# Patient Record
Sex: MALE | Race: WHITE | NOT HISPANIC OR LATINO | Employment: FULL TIME | URBAN - METROPOLITAN AREA
[De-identification: names, ages, dates, MRNs, and addresses within clinical notes are randomized per-mention and may not be internally consistent; named-entity substitution may affect disease eponyms.]

---

## 2020-01-09 ENCOUNTER — OFFICE VISIT (OUTPATIENT)
Dept: UROLOGY | Facility: CLINIC | Age: 60
End: 2020-01-09
Payer: COMMERCIAL

## 2020-01-09 ENCOUNTER — TELEPHONE (OUTPATIENT)
Dept: UROLOGY | Facility: CLINIC | Age: 60
End: 2020-01-09

## 2020-01-09 VITALS
BODY MASS INDEX: 25.2 KG/M2 | WEIGHT: 180 LBS | HEART RATE: 64 BPM | RESPIRATION RATE: 18 BRPM | DIASTOLIC BLOOD PRESSURE: 79 MMHG | SYSTOLIC BLOOD PRESSURE: 115 MMHG | HEIGHT: 71 IN

## 2020-01-09 DIAGNOSIS — R10.2 MALE PERINEAL PAIN: ICD-10-CM

## 2020-01-09 DIAGNOSIS — N41.1 CHRONIC BACTERIAL PROSTATITIS: Primary | ICD-10-CM

## 2020-01-09 DIAGNOSIS — N43.40 SPERMATOCELE: ICD-10-CM

## 2020-01-09 PROBLEM — I10 HYPERTENSIVE DISORDER: Status: ACTIVE | Noted: 2019-05-14

## 2020-01-09 PROBLEM — F41.9 ANXIETY: Status: ACTIVE | Noted: 2019-05-14

## 2020-01-09 PROBLEM — E05.90 HYPERTHYROIDISM: Status: ACTIVE | Noted: 2019-05-14

## 2020-01-09 LAB
BILIRUB UR QL STRIP: NEGATIVE
BILIRUB UR QL STRIP: NEGATIVE
GLUCOSE UR QL STRIP: NEGATIVE
GLUCOSE UR QL STRIP: NEGATIVE
KETONES UR QL STRIP: NEGATIVE
KETONES UR QL STRIP: NEGATIVE
LEUKOCYTE ESTERASE UR QL STRIP: NEGATIVE
LEUKOCYTE ESTERASE UR QL STRIP: NEGATIVE
PH, POC UA: 5.5
PH, POC UA: 6
POC AMORP, URINE: 0
POC AMORP, URINE: 0
POC BACTI, URINE: 0
POC BACTI, URINE: ABNORMAL
POC BLOOD, URINE: NEGATIVE
POC BLOOD, URINE: POSITIVE
POC CASTS, URINE: 0
POC CASTS, URINE: 0
POC CRYST, URINE: 0
POC CRYST, URINE: 0
POC EPITH, URINE: 0
POC EPITH, URINE: ABNORMAL
POC HCG, URINE: ABNORMAL
POC HCG, URINE: ABNORMAL
POC HYALIN, URINE: 0 LPF
POC HYALIN, URINE: 0 LPF
POC MUCUS, URINE: 0
POC MUCUS, URINE: ABNORMAL
POC NITRATES, URINE: NEGATIVE
POC NITRATES, URINE: NEGATIVE
POC OTHER, URINE: 0
POC OTHER, URINE: 0
POC RBC, URINE: 0 HPF
POC RBC, URINE: ABNORMAL HPF
POC WBC, URINE: 0 HPF
POC WBC, URINE: ABNORMAL HPF
PROT UR QL STRIP: NEGATIVE
PROT UR QL STRIP: NEGATIVE
PSA, DIAGNOSTIC: 1.6 NG/ML (ref 0.1–4)
SP GR UR STRIP: 1.02 (ref 1–1.03)
SP GR UR STRIP: 1.02 (ref 1–1.03)
UROBILINOGEN UR STRIP-ACNC: 0.2 (ref 0.3–2.2)
UROBILINOGEN UR STRIP-ACNC: 0.2 (ref 0.3–2.2)

## 2020-01-09 PROCEDURE — 81001 PR  URINALYSIS, AUTO, W/SCOPE: ICD-10-PCS | Mod: S$GLB,,, | Performed by: NURSE PRACTITIONER

## 2020-01-09 PROCEDURE — 99204 PR OFFICE/OUTPT VISIT, NEW, LEVL IV, 45-59 MIN: ICD-10-PCS | Mod: 25,S$GLB,, | Performed by: NURSE PRACTITIONER

## 2020-01-09 PROCEDURE — 81001 URINALYSIS AUTO W/SCOPE: CPT | Mod: S$GLB,,, | Performed by: NURSE PRACTITIONER

## 2020-01-09 PROCEDURE — 36415 COLL VENOUS BLD VENIPUNCTURE: CPT | Mod: S$GLB,,, | Performed by: NURSE PRACTITIONER

## 2020-01-09 PROCEDURE — 36415 PR COLLECTION VENOUS BLOOD,VENIPUNCTURE: ICD-10-PCS | Mod: S$GLB,,, | Performed by: NURSE PRACTITIONER

## 2020-01-09 PROCEDURE — 3008F PR BODY MASS INDEX (BMI) DOCUMENTED: ICD-10-PCS | Mod: CPTII,S$GLB,, | Performed by: NURSE PRACTITIONER

## 2020-01-09 PROCEDURE — 99204 OFFICE O/P NEW MOD 45 MIN: CPT | Mod: 25,S$GLB,, | Performed by: NURSE PRACTITIONER

## 2020-01-09 PROCEDURE — 3008F BODY MASS INDEX DOCD: CPT | Mod: CPTII,S$GLB,, | Performed by: NURSE PRACTITIONER

## 2020-01-09 RX ORDER — METHYLPHENIDATE HYDROCHLORIDE 10 MG/1
TABLET ORAL
COMMUNITY
Start: 2019-12-14

## 2020-01-09 RX ORDER — CIPROFLOXACIN 250 MG/1
TABLET, FILM COATED ORAL
COMMUNITY
Start: 2019-11-25

## 2020-01-09 RX ORDER — METOPROLOL TARTRATE 50 MG/1
TABLET ORAL
COMMUNITY
Start: 2020-01-03

## 2020-01-09 RX ORDER — LEVOTHYROXINE SODIUM 137 UG/1
TABLET ORAL
COMMUNITY
Start: 2020-01-03

## 2020-01-09 RX ORDER — CLONAZEPAM 0.5 MG/1
TABLET ORAL
COMMUNITY
Start: 2019-12-17

## 2020-01-09 RX ORDER — OMEPRAZOLE 40 MG/1
40 CAPSULE, DELAYED RELEASE ORAL DAILY
COMMUNITY
Start: 2019-12-11

## 2020-01-09 NOTE — PROGRESS NOTES
Chief Complaint:   Chief Complaint   Patient presents with    Other     burning constant not just when he urinates. when he urinates it soothes the burn actually.       HPI:  59-year-old  male who was last seen by Dr. Sanderson in 2016.  Patient initially presented to his PCP Dr. Villalba with complaints of burning and prostate pain times 3-4 months.  Patient states that his prostate was examined at the time and he was given a prescription for Cipro x7 days.  He states that his symptoms were not relieved so he presented to the urologist Dr. Machado who again examined his prostate and stated that it was soft, expressed fluid and microscopic findings were consistent with infection per patient report.  He was prescribed Bactrim DS which he took for 4-5 days but caused him significant side effects so he was then given Cipro x3 weeks.  Patient states that he took his last dose of Cipro yesterday but continues to have symptoms.  He reports a deep, burning, uncomfortable sensation to his perineal area.  He describes a feeling of being on fire.  He states that urination relieves the pain for a short period of time but then it returns.  He denies any blood in his urine or semen.  He denies any pain with ejaculation.  He denies any fever, chills, or night sweats.  He is not on any prostate medications such as alpha blockers.    He states that his PSA was checked at the end of November 2019 by Dr. Machado resulting at 1.    Medical records thoroughly reviewed.  Patient has a history of large bilateral spermatoceles, underwent bilateral scrotal exploration and bilateral spermatocelectomy with partial epididymectomy on July 17, 2015.  His largest epididymal cyst was 4 cm.  Following the procedure, he had excruciating pain that gradually improved over time.  An ultrasound performed in October 2015 showed tubular ectasia of the retest is suggested bilaterally with non tubular dilation of the tubular ectasia.  He was  offered a deep penile block on the right side to see if that would alleviate his pain, but he declined at that time.      Allergies:  Bactrim [sulfamethoxazole-trimethoprim]    Medications:  has a current medication list which includes the following prescription(s): ciprofloxacin hcl, clonazepam, levothyroxine, methylphenidate hcl, metoprolol tartrate, and omeprazole.    Review of Systems:  General: No fever, chills, vision changes, dizziness, weakness, fatigue, unexplained weight loss, confusion, or mood swings.  Skin: No rashes, itching, or changes in color/texture of skin.  Chest: Denies WILKERSON, cyanosis, wheezing, cough, and sputum production.  Abdomen: Appetite fine. Denies diarrhea, abdominal pain, hematemesis, or blood in stool.  Musculoskeletal: No joint stiffness or swelling. Denies back pain.  : As above.  All other review of systems negative.    PMH:   has a past medical history of Hypertension.    PSH:   has a past surgical history that includes Total thyroidectomy; Tonsillectomy; and Finger surgery (Right).    FamHx: family history includes Cancer in his mother; Kidney cancer in his mother.    SocHx:  reports that he has quit smoking. His smoking use included cigarettes. His smokeless tobacco use includes snuff. He reports that he drank alcohol. He reports that he does not use drugs.      Physical Exam:  Vitals:    01/09/20 0829   BP: 115/79   Pulse: 64   Resp: 18     General: AAOx3, no apparent distress, no deformities  Neck: supple, no masses, normal thyroid  Lungs: normal inspiration, no use of accessory muscles  Heart: regular rate and rhythm, no arrhythmias  Abdomen: soft, NT, ND, no masses, no hernias, no hepatosplenomegaly  Lymphatic: no unusually enlarged or tender lymph nodes  Skin: warm and dry, no jaundice.  Ext: without edema or deformity.  : Normal rectal tone, small external hemorrhoids. Prostate firm, smooth surface, no nodules or masses appreciated.     Labs/Studies: UA voided sample  negative, UA p prostate exam shows WBCs 0-2/hpf, RBCs 0-2/hpf, and mucus 1+    Impression/Plan:   Chronic bacterial prostatitis  Comments:  has recently been treated with Cipro x 30 days as well as Bactrim x 5 days; RAYMOND acc- firm prostate, will send prostate fluid for culture  Orders:  -     POCT Urinalysis (w/Micro Option)  -     POCT Urinalysis (w/Micro Option)  -     Urine culture  -     Cystoscopy; Future; Expected date: 01/09/2020  -     Prostate Specific Antigen, Diagnostic    Male perineal pain  Comments:  possibly due to above, may be related to prior issues with the testicles, has been offered a deep penile block on the right side in the past  Orders:  -     Cystoscopy; Future; Expected date: 01/09/2020    Spermatocele  Comments:  hx of large spermatoceles s/p mariam spermatocelectomy c partial epidymectomy 7/2015        Follow up in about 4 weeks (around 2/6/2020) for [facility]SC.

## 2020-01-15 ENCOUNTER — TELEPHONE (OUTPATIENT)
Dept: UROLOGY | Facility: CLINIC | Age: 60
End: 2020-01-15

## 2020-02-07 ENCOUNTER — PROCEDURE VISIT (OUTPATIENT)
Dept: UROLOGY | Facility: CLINIC | Age: 60
End: 2020-02-07
Payer: COMMERCIAL

## 2020-02-07 VITALS
BODY MASS INDEX: 25.06 KG/M2 | WEIGHT: 179 LBS | DIASTOLIC BLOOD PRESSURE: 74 MMHG | OXYGEN SATURATION: 98 % | HEART RATE: 74 BPM | HEIGHT: 71 IN | SYSTOLIC BLOOD PRESSURE: 149 MMHG | RESPIRATION RATE: 18 BRPM

## 2020-02-07 DIAGNOSIS — N41.1 CHRONIC BACTERIAL PROSTATITIS: ICD-10-CM

## 2020-02-07 DIAGNOSIS — R10.2 MALE PERINEAL PAIN: ICD-10-CM

## 2020-02-07 PROCEDURE — 76872 TRUS: ICD-10-PCS | Mod: S$GLB,,, | Performed by: SPECIALIST

## 2020-02-07 PROCEDURE — 52000 CYSTOSCOPY: ICD-10-PCS | Mod: S$GLB,,, | Performed by: SPECIALIST

## 2020-02-07 PROCEDURE — 52000 CYSTOURETHROSCOPY: CPT | Mod: S$GLB,,, | Performed by: SPECIALIST

## 2020-02-07 PROCEDURE — 76872 US TRANSRECTAL: CPT | Mod: S$GLB,,, | Performed by: SPECIALIST

## 2020-02-07 RX ORDER — BACLOFEN 10 MG/1
10 TABLET ORAL 3 TIMES DAILY
Qty: 90 TABLET | Refills: 11 | Status: SHIPPED | OUTPATIENT
Start: 2020-02-07 | End: 2021-02-06

## 2020-02-07 RX ORDER — CIPROFLOXACIN 500 MG/1
500 TABLET ORAL
Status: COMPLETED | OUTPATIENT
Start: 2020-02-07 | End: 2020-02-07

## 2020-02-07 RX ADMIN — CIPROFLOXACIN 500 MG: 500 TABLET ORAL at 03:02

## 2020-02-07 NOTE — PATIENT INSTRUCTIONS
Cystoscopy    Cystoscopy is a procedure that lets your doctor look directly inside your urethra and bladder. It can be used to:  · Help diagnose a problem with your urethra, bladder, or kidneys.  · Take a sample (biopsy) of bladder or urethral tissue.  · Treat certain problems (such as removing kidney stones).  · Place a stent to bypass an obstruction.  · Take special X-rays of the kidneys.  Based on the findings, your doctor may recommend other tests or treatments.  What is a cystoscope?  A cystoscope is a telescope-like instrument that contains lenses and fiberoptics (small glass wires that make bright light). The cystoscope may be straight and rigid, or flexible to bend around curves in the urethra. The doctor may look directly into the cystoscope, or project the image onto a monitor.  Getting ready  · Ask your doctor if you should stop taking any medicines before the procedure.  · Ask whether you should avoid eating or drinking anything after midnight before the procedure.  · Follow any other instructions your doctor gives you.  Tell your doctor before the exam if you:  · Take any medicines, such as aspirin or blood thinners  · Have allergies to any medicines  · Are pregnant   The procedure  Cystoscopy is done in the doctors office, surgery center, or hospital. The doctor and a nurse are present during the procedure. It takes only a few minutes, longer if a biopsy, X-ray, or treatment needs to be done.  During the procedure:  · You lie on an exam table on your back, knees bent and legs apart. You are covered with a drape.  · Your urethra and the area around it are washed. Anesthetic jelly may be applied to numb the urethra. Other pain medicine is usually not needed. In some cases, you may be offered a mild sedative to help you relax. If a more extensive procedure is to be done, such as a biopsy or kidney stone removal, general anesthesia may be needed.  · The cystoscope is inserted. A sterile fluid is put  into the bladder to expand it. You may feel pressure from this fluid.  · When the procedure is done, the cystoscope is removed.  After the procedure  If you had a sedative, general anesthesia, or spinal anesthesia, you must have someone drive you home. Once youre home:  · Drink plenty of fluids.  · You may have burning or light bleeding when you urinate--this is normal.  · Medicines may be prescribed to ease any discomfort or prevent infection. Take these as directed.  · Call your doctor if you have heavy bleeding or blood clots, burning that lasts more than a day, a fever over 100°F  (38° C), or trouble urinating.  Date Last Reviewed: 1/1/2017  © 5978-2089 [employer]. [address] [place] [address], [place] [address]. All rights reserved. This information is not intended as a substitute for professional medical care. Always follow your healthcare professional's instructions.

## 2020-02-08 NOTE — PROCEDURES
"Cystoscopy  Date/Time: 2/7/2020 2:40 PM  Performed by: Thomas Sanderson MD  Authorized by: Thomas Sanderson MD     Consent Done?:  Yes (Written)  Time out: Immediately prior to procedure a "time out" was called to verify the correct patient, procedure, equipment, support staff and site/side marked as required.    Indications: dysuria    Indications comment:  Perineal pain  Position:  Supine  Anesthesia:  Intraurethral instillation  Preparation: Patient was prepped and draped in usual sterile fashion      Scope type:  Flexible cystoscope  External exam normal: Yes    Urethra normal: Yes    Prostate normal: Yes  Bladder neck normal: Bladder neck normal   Bladder normal: Yes      Patient tolerance:  Patient tolerated the procedure well with no immediate complications  TRUS  Date/Time: 2/7/2020 2:40 PM  Performed by: Thomas Sanderson MD  Authorized by: Thomas Sanderson MD     Consent Done?:  Yes (Written)  Time out: Immediately prior to procedure a "time out" was called to verify the correct patient, procedure, equipment, support staff and site/side marked as required.    Indications comment:  Perineal pain  Preparation: Patient was prepped and draped in usual sterile fashion    Position:  Left lateral  Patient sedated: No    Lesions:: No      Patient tolerance:  Patient tolerated the procedure well with no immediate complications     Transrectal ultrasound or prostate was performed to look for any evidence of ejaculatory duct obstruction.  Seminal vesicles were found to be normal.  Prostate was normal.  Prostate volume was not taken.    There is no anatomical explanation for the patient's perineal discomfort and persistent light dysuria.  Patient is already taking clonazepam.  I am going to treat him with baclofen 10 mg 1 tablet 3 times a day give that a trial for about 4 weeks.  We should reassess patient in about 6 weeks.      "